# Patient Record
(demographics unavailable — no encounter records)

---

## 2025-03-13 NOTE — HISTORY OF PRESENT ILLNESS
[FreeTextEntry1] : 53y/o presents for postmenopausal bleeding  LMP Jan 2024, pt now reports spotting for about 2 months, sees spotting 2-3 times per week. Sees the spotting on a panty liner, reports color is bright red.  Reports clot passage about 2 months ago.  Pt denies pain/cramping  Sexually active, denies change to spotting with sex    OBHx: P0 GynHx: hx of endometrial polyps removed years ago, hx of ovarian cystectomy 1996. 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

## 2025-03-13 NOTE — HISTORY OF PRESENT ILLNESS
[FreeTextEntry1] : 55y/o presents for postmenopausal bleeding  LMP Jan 2024, pt now reports spotting for about 2 months, sees spotting 2-3 times per week. Sees the spotting on a panty liner, reports color is bright red.  Reports clot passage about 2 months ago.  Pt denies pain/cramping  Sexually active, denies change to spotting with sex    OBHx: P0 GynHx: hx of endometrial polyps removed years ago, hx of ovarian cystectomy 1996. 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

## 2025-03-13 NOTE — PLAN
[FreeTextEntry1] : 53y/o presents with postmenopausal spotting -Pt counseled that spotting/bleeding after menopause suggests underlying pathology -Requisition for pelvic sono provided -Recommend endometrial sampling with in office EMB vs hysteroscopy either in the office or in the OR -RTO after sono for further discussion   henna MORILLO